# Patient Record
Sex: FEMALE | Race: WHITE | NOT HISPANIC OR LATINO | Employment: FULL TIME | URBAN - METROPOLITAN AREA
[De-identification: names, ages, dates, MRNs, and addresses within clinical notes are randomized per-mention and may not be internally consistent; named-entity substitution may affect disease eponyms.]

---

## 2025-08-03 ENCOUNTER — OFFICE VISIT (OUTPATIENT)
Dept: URGENT CARE | Age: 65
End: 2025-08-03
Payer: COMMERCIAL

## 2025-08-03 VITALS
WEIGHT: 210 LBS | SYSTOLIC BLOOD PRESSURE: 111 MMHG | DIASTOLIC BLOOD PRESSURE: 77 MMHG | TEMPERATURE: 99.1 F | RESPIRATION RATE: 18 BRPM | HEART RATE: 89 BPM | OXYGEN SATURATION: 95 %

## 2025-08-03 DIAGNOSIS — L50.0 ALLERGIC URTICARIA: Primary | ICD-10-CM

## 2025-08-03 PROCEDURE — 1036F TOBACCO NON-USER: CPT | Performed by: FAMILY MEDICINE

## 2025-08-03 PROCEDURE — 99203 OFFICE O/P NEW LOW 30 MIN: CPT | Performed by: FAMILY MEDICINE

## 2025-08-03 PROCEDURE — 1159F MED LIST DOCD IN RCRD: CPT | Performed by: FAMILY MEDICINE

## 2025-08-03 RX ORDER — LEVOTHYROXINE SODIUM 75 UG/1
75 TABLET ORAL
COMMUNITY
Start: 2025-03-27

## 2025-08-03 RX ORDER — ROSUVASTATIN CALCIUM 5 MG/1
5 TABLET, COATED ORAL NIGHTLY
COMMUNITY
Start: 2025-03-27

## 2025-08-03 RX ORDER — ALBUTEROL SULFATE 90 UG/1
2 INHALANT RESPIRATORY (INHALATION) EVERY 4 HOURS PRN
COMMUNITY
Start: 2024-06-10

## 2025-08-03 RX ORDER — AMLODIPINE BESYLATE 10 MG/1
10 TABLET ORAL
COMMUNITY
Start: 2025-03-27

## 2025-08-03 RX ORDER — METHYLPREDNISOLONE 4 MG/1
TABLET ORAL
Qty: 21 TABLET | Refills: 0 | Status: SHIPPED | OUTPATIENT
Start: 2025-08-03 | End: 2025-08-09

## 2025-08-03 NOTE — PROGRESS NOTES
Subjective   Patient ID: Patrizia Grewal is a 65 y.o. female. They present today with a chief complaint of Rash (Itching x 2 weeks. Facial swelling and redness.).    History of Present Illness  HPI  Patient presents with 2 weeks of itchy rash, redness and swelling over face.  Patient states that facial swelling and itching have spread and become worse over the past 2 days.  She denies any shortness of breath, throat swelling, fevers or chills.  No wheezing or increased coughing.  Patient states that she is allergic to soy and may have been exposed to this a week or so ago but denies any further exposures in the past several days.  She has taken 25 mg of Benadryl with no improvement over the past 3 days.  No chest pains.  No nausea vomiting or diarrhea.  Past Medical History  Allergies as of 08/03/2025    (No Known Allergies)       Prescriptions Prior to Admission[1]     Medical History[2]    Surgical History[3]     reports that she has never smoked. She has never used smokeless tobacco.    Review of Systems  Review of Systems       As in history of present illness                        Objective    Vitals:    08/03/25 0924   BP: 111/77   Pulse: 89   Resp: 18   Temp: 37.3 °C (99.1 °F)   SpO2: 95%   Weight: 95.3 kg (210 lb)     No LMP recorded.    Physical Exam  General: Vitals noted, no distress. Afebrile.   EENT: TMs clear. Posterior oropharynx unremarkable.   Cardiac: Regular, rate, rhythm, no murmur.   Pulmonary: Lungs clear bilaterally with good aeration. No adventitious breath sounds.    Extremities: No peripheral edema. Negative Homans bilaterally, no cords.   Skin: Mildly erythematous swollen facial and anterior neck skin with no pustules or vesicles.  Several other patches of urticarial lesions noted on extremities.  Procedures    Point of Care Test & Imaging Results from this visit  No results found for this visit on 08/03/25.   Imaging  No results found.    Cardiology, Vascular, and Other Imaging  No other  imaging results found for the past 2 days      Diagnostic study results (if any) were reviewed by Orlando Kapadia MD.    Assessment/Plan   Allergies, medications, history, and pertinent labs/EKGs/Imaging reviewed by Orlando Kapadia MD.     Medical Decision Making  At time of discharge patient was clinically well-appearing and HDS for outpatient management. The patient and/or family was educated regarding diagnosis, supportive care, OTC and Rx medications. The patient and/or family was given the opportunity to ask questions prior to discharge.  They verbalized understanding of my discussion of the plans for treatment, expected course, indications to return to  or seek further evaluation in ED, and the need for timely follow up as directed.   They were provided with a work/school excuse if requested.    Orders and Diagnoses  Diagnoses and all orders for this visit:  Allergic urticaria  -     methylPREDNISolone (Medrol, Wilfredo,) 4 mg tablets; Follow schedule on package instructions      Medical Admin Record      Patient disposition: Home    Electronically signed by Orlando Kapadia MD  9:45 AM           [1] (Not in a hospital admission)   [2] No past medical history on file.  [3] No past surgical history on file.

## 2025-08-03 NOTE — PATIENT INSTRUCTIONS
Increased oral Benadryl to 50 mg 3 times a day as needed  Use Medrol Dosepak as directed with food over the next 6 days  May use Aveeno baths or lotion as needed for itching  Call 911 for throat swelling or difficulty breathing  Follow-up with PCP in 3 to 5 days if no improvement